# Patient Record
(demographics unavailable — no encounter records)

---

## 2025-01-21 NOTE — DISCUSSION/SUMMARY
[FreeTextEntry1] : Child is a 4 year old male here for febrile pharyngitis. Rapid strep and rapid flu neg. Throat cx sent. Likely viral, continue supportive care. Can give antipyretics PRN and encourage oral hydration. RTC for worsening or persistent symptoms.

## 2025-01-21 NOTE — HISTORY OF PRESENT ILLNESS
[Fever] : FEVER [___ Day(s)] : [unfilled] day(s) [Intermittent] : intermittent [Max Temp: ____] : Max temperature: [unfilled] [Acetaminophen] : acetaminophen [Ibuprofen] : ibuprofen [Sore Throat] : sore throat [Decreased Appetite] : decreased appetite [Stable] : stable [Ear Pain] : no ear pain [Runny Nose] : no runny nose [Nasal Congestion] : no nasal congestion [Cough] : no cough [Vomiting] : no vomiting [Diarrhea] : no diarrhea [Decreased Urine Output] : no decreased urine output [Rash] : no rash